# Patient Record
(demographics unavailable — no encounter records)

---

## 2025-06-12 NOTE — PLAN
[FreeTextEntry1] : GYN visit today We discussed -- Abnormal uterine bleeding and Thickened endometrium causes and treatment options. Patient verbalized understanding of all explanations, and her questions were answered, and all concerns were addressed. Patient was already screened this year, PHQ-2 on file. RTO in 3-4 months f/u TVS f/u IUD and thickening ----------------------------------------------------------------------------------------------------------------------------------------------------- ----------------------------------------------------------------------------------------------------------------------------------------------------- GYN procedure today Endometrial biopsy and Mirena IUD insertion today. EMB sent for pathology We discussed benefits of the intrauterine device, due to the findings of the pelvic MRI which showed abnormally thickening of the junctional zone which likely consists with adenomyosis. Plan to insert an IUD to manage abnormal uterine bleeding and pain. Endometrial biopsy to be performed prior to IUD insertion to rule out endometrial pathology. Follow-up in 3-4 months to check IUD position and bleeding pattern. Patient verbalized understanding of all explanations, and her questions were answered, and all concerns were addressed. Patient was already screened this year, PHQ-2 on file. RTO in 3-4 months f/u TVS f/u IUD and thickening   I, Reed vernon acting as scribe for Dr. Bradford. 06/11/2025   The documentation recorded by the scribe, in my presence, accurately reflects the service I personally performed, and the decisions made by me with my edits as appropriate on 06/12/2025  Lety Bradford MD, FACOG

## 2025-06-12 NOTE — PROCEDURE
[Endometrial Biopsy] : Endometrial biopsy [Thickened Endometrium] : thickened endometrium [IUD Placement] : intrauterine device (IUD) placement [Abnormal Uterine Bleeding] : abnormal uterine bleeding [Risks] : risks [Benefits] : benefits [Alternatives] : alternatives [Patient] : patient [No Premedication] : No premedication [Mirena IUD] : Mirena IUD [Time out performed] : Pre-procedure time out performed.  Patient's name, date of birth and procedure confirmed. [Consent Obtained] : Consent obtained [Infection] : infection [Bleeding] : bleeding [Allergic Reaction] : allergic reaction [Uterine Perforation] : uterine perforation [Pain] : pain [N/A] : pregnancy test not applicable [Betadine] : Betadine [Tenaculum] : Tenaculum [Required Dilation] : required dilation [Anteverted] : anteverted [Moderate] : moderate [Specimen Collected] : collected [Sent to Pathology] : placed in buffered formalin and sent for pathology [US Guided] : Ultrasound was used to guide the endometrial biopsy [US Guidance] : US Guidance [Tolerated Well] : Patient tolerated the procedure well [No Complications] : No complications [None] : None [History of Unprotected DISH] : no history of unprotected intercourse [LMPDate] : 6/4/25 [de-identified] : [id number] [de-identified] : JUN 2027 [de-identified] : 6/11/2032

## 2025-06-12 NOTE — PROCEDURE
[Endometrial Biopsy] : Endometrial biopsy [Thickened Endometrium] : thickened endometrium [IUD Placement] : intrauterine device (IUD) placement [Abnormal Uterine Bleeding] : abnormal uterine bleeding [Risks] : risks [Benefits] : benefits [Alternatives] : alternatives [Patient] : patient [No Premedication] : No premedication [Mirena IUD] : Mirena IUD [Time out performed] : Pre-procedure time out performed.  Patient's name, date of birth and procedure confirmed. [Consent Obtained] : Consent obtained [Infection] : infection [Bleeding] : bleeding [Allergic Reaction] : allergic reaction [Uterine Perforation] : uterine perforation [Pain] : pain [N/A] : pregnancy test not applicable [Betadine] : Betadine [Tenaculum] : Tenaculum [Required Dilation] : required dilation [Anteverted] : anteverted [Moderate] : moderate [Specimen Collected] : collected [Sent to Pathology] : placed in buffered formalin and sent for pathology [US Guided] : Ultrasound was used to guide the endometrial biopsy [US Guidance] : US Guidance [Tolerated Well] : Patient tolerated the procedure well [No Complications] : No complications [None] : None [History of Unprotected [place]] : no history of unprotected intercourse [LMPDate] : 6/4/25 [de-identified] : [id number] [de-identified] : JUN 2027 [de-identified] : 6/11/2032

## 2025-07-16 NOTE — PHYSICAL EXAM
[MA] : MA [Appropriately responsive] : appropriately responsive [Alert] : alert [No Acute Distress] : no acute distress [Oriented x3] : oriented x3 [Labia Majora] : normal [Labia Minora] : normal [No Bleeding] : There was no active vaginal bleeding [Normal] : normal [Enlarged ___ wks] : enlarged [unfilled] ~Uweeks [Uterine Adnexae] : normal [Chaperoned Physical Exam] : A chaperone was present in the examining room during all aspects of the physical examination. [FreeTextEntry2] : Reed

## 2025-07-16 NOTE — HISTORY OF PRESENT ILLNESS
[Patient reported PAP Smear was normal] : Patient reported PAP Smear was normal [Y] : Patient uses contraception [IUD] : has an intrauterine device [Abnormal Quantity] : abnormal quantity [Abnormal Duration] : abnormal duration [Moderate Bleeding] : moderate bleeding [Pain] : pain [No] : Patient does not have concerns regarding sex [FreeTextEntry1] : 48 YO F patient presents for GYN follow up check-up exam. Chief c/o: pelvic pain, LBP, has leg and face swelling and feels very fatigued. Has been bleeding since 6/27/25  [PapSmeardate] : 5/7/25 [TextBox_31] : emb 6/11/25 [HPVDate] : 5/7/25 [LMPDate] : 6/27/25